# Patient Record
Sex: MALE | Race: BLACK OR AFRICAN AMERICAN | NOT HISPANIC OR LATINO | Employment: STUDENT | URBAN - METROPOLITAN AREA
[De-identification: names, ages, dates, MRNs, and addresses within clinical notes are randomized per-mention and may not be internally consistent; named-entity substitution may affect disease eponyms.]

---
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
## 2024-05-20 NOTE — ED NOTES
06/30/2020  0913  Lab reports + culture for gonorrheae. Dr. omalley informed. Pt treated & discharged with antibiotics. No new orders. Pt called. [phone]. No answer. Left message for return call to ER.     used